# Patient Record
Sex: FEMALE | Race: BLACK OR AFRICAN AMERICAN | NOT HISPANIC OR LATINO | URBAN - METROPOLITAN AREA
[De-identification: names, ages, dates, MRNs, and addresses within clinical notes are randomized per-mention and may not be internally consistent; named-entity substitution may affect disease eponyms.]

---

## 2021-08-26 ENCOUNTER — EMERGENCY (EMERGENCY)
Facility: HOSPITAL | Age: 56
LOS: 1 days | Discharge: HOME | End: 2021-08-26
Attending: EMERGENCY MEDICINE
Payer: COMMERCIAL

## 2021-08-26 VITALS
OXYGEN SATURATION: 100 % | HEIGHT: 64 IN | DIASTOLIC BLOOD PRESSURE: 80 MMHG | WEIGHT: 179.9 LBS | HEART RATE: 97 BPM | TEMPERATURE: 98 F | RESPIRATION RATE: 20 BRPM | SYSTOLIC BLOOD PRESSURE: 123 MMHG

## 2021-08-26 DIAGNOSIS — Z90.49 ACQUIRED ABSENCE OF OTHER SPECIFIED PARTS OF DIGESTIVE TRACT: Chronic | ICD-10-CM

## 2021-08-26 DIAGNOSIS — Z98.89 OTHER SPECIFIED POSTPROCEDURAL STATES: Chronic | ICD-10-CM

## 2021-08-26 PROCEDURE — 99285 EMERGENCY DEPT VISIT HI MDM: CPT

## 2021-08-26 PROCEDURE — 93010 ELECTROCARDIOGRAM REPORT: CPT

## 2021-08-26 NOTE — ED ADULT TRIAGE NOTE - CHIEF COMPLAINT QUOTE
covid positive 8/5; reports improvement of symptoms till yesterday; return of sob with exertion and r calf pain radiating to upper r thigh

## 2021-08-27 VITALS
HEART RATE: 75 BPM | SYSTOLIC BLOOD PRESSURE: 132 MMHG | TEMPERATURE: 98 F | DIASTOLIC BLOOD PRESSURE: 84 MMHG | OXYGEN SATURATION: 98 % | RESPIRATION RATE: 15 BRPM

## 2021-08-27 LAB
ALBUMIN SERPL ELPH-MCNC: 4.3 G/DL — SIGNIFICANT CHANGE UP (ref 3.3–5)
ALP SERPL-CCNC: 55 U/L — SIGNIFICANT CHANGE UP (ref 40–120)
ALT FLD-CCNC: 40 U/L — SIGNIFICANT CHANGE UP (ref 10–45)
ANION GAP SERPL CALC-SCNC: 14 MMOL/L — SIGNIFICANT CHANGE UP (ref 5–17)
APTT BLD: 36 SEC — HIGH (ref 27.5–35.5)
AST SERPL-CCNC: 39 U/L — SIGNIFICANT CHANGE UP (ref 10–40)
BASOPHILS # BLD AUTO: 0.03 K/UL — SIGNIFICANT CHANGE UP (ref 0–0.2)
BASOPHILS NFR BLD AUTO: 0.7 % — SIGNIFICANT CHANGE UP (ref 0–2)
BILIRUB SERPL-MCNC: 0.5 MG/DL — SIGNIFICANT CHANGE UP (ref 0.2–1.2)
BUN SERPL-MCNC: 11 MG/DL — SIGNIFICANT CHANGE UP (ref 7–23)
CALCIUM SERPL-MCNC: 9.5 MG/DL — SIGNIFICANT CHANGE UP (ref 8.4–10.5)
CHLORIDE SERPL-SCNC: 102 MMOL/L — SIGNIFICANT CHANGE UP (ref 96–108)
CO2 SERPL-SCNC: 27 MMOL/L — SIGNIFICANT CHANGE UP (ref 22–31)
CREAT SERPL-MCNC: 0.84 MG/DL — SIGNIFICANT CHANGE UP (ref 0.5–1.3)
D DIMER BLD IA.RAPID-MCNC: 280 NG/ML DDU — HIGH
EOSINOPHIL # BLD AUTO: 0.26 K/UL — SIGNIFICANT CHANGE UP (ref 0–0.5)
EOSINOPHIL NFR BLD AUTO: 6.1 % — HIGH (ref 0–6)
GLUCOSE SERPL-MCNC: 111 MG/DL — HIGH (ref 70–99)
HCT VFR BLD CALC: 36.1 % — SIGNIFICANT CHANGE UP (ref 34.5–45)
HGB BLD-MCNC: 11.6 G/DL — SIGNIFICANT CHANGE UP (ref 11.5–15.5)
IMM GRANULOCYTES NFR BLD AUTO: 0.2 % — SIGNIFICANT CHANGE UP (ref 0–1.5)
INR BLD: 1.04 RATIO — SIGNIFICANT CHANGE UP (ref 0.88–1.16)
LYMPHOCYTES # BLD AUTO: 2.09 K/UL — SIGNIFICANT CHANGE UP (ref 1–3.3)
LYMPHOCYTES # BLD AUTO: 48.8 % — HIGH (ref 13–44)
MCHC RBC-ENTMCNC: 29.2 PG — SIGNIFICANT CHANGE UP (ref 27–34)
MCHC RBC-ENTMCNC: 32.1 GM/DL — SIGNIFICANT CHANGE UP (ref 32–36)
MCV RBC AUTO: 90.9 FL — SIGNIFICANT CHANGE UP (ref 80–100)
MONOCYTES # BLD AUTO: 0.44 K/UL — SIGNIFICANT CHANGE UP (ref 0–0.9)
MONOCYTES NFR BLD AUTO: 10.3 % — SIGNIFICANT CHANGE UP (ref 2–14)
NEUTROPHILS # BLD AUTO: 1.45 K/UL — LOW (ref 1.8–7.4)
NEUTROPHILS NFR BLD AUTO: 33.9 % — LOW (ref 43–77)
NRBC # BLD: 0 /100 WBCS — SIGNIFICANT CHANGE UP (ref 0–0)
NT-PROBNP SERPL-SCNC: 51 PG/ML — SIGNIFICANT CHANGE UP (ref 0–300)
PLATELET # BLD AUTO: 289 K/UL — SIGNIFICANT CHANGE UP (ref 150–400)
POTASSIUM SERPL-MCNC: 3.5 MMOL/L — SIGNIFICANT CHANGE UP (ref 3.5–5.3)
POTASSIUM SERPL-SCNC: 3.5 MMOL/L — SIGNIFICANT CHANGE UP (ref 3.5–5.3)
PROT SERPL-MCNC: 7.6 G/DL — SIGNIFICANT CHANGE UP (ref 6–8.3)
PROTHROM AB SERPL-ACNC: 12.4 SEC — SIGNIFICANT CHANGE UP (ref 10.6–13.6)
RBC # BLD: 3.97 M/UL — SIGNIFICANT CHANGE UP (ref 3.8–5.2)
RBC # FLD: 14.1 % — SIGNIFICANT CHANGE UP (ref 10.3–14.5)
SODIUM SERPL-SCNC: 143 MMOL/L — SIGNIFICANT CHANGE UP (ref 135–145)
TROPONIN T, HIGH SENSITIVITY RESULT: 12 NG/L — SIGNIFICANT CHANGE UP (ref 0–51)
WBC # BLD: 4.28 K/UL — SIGNIFICANT CHANGE UP (ref 3.8–10.5)
WBC # FLD AUTO: 4.28 K/UL — SIGNIFICANT CHANGE UP (ref 3.8–10.5)

## 2021-08-27 PROCEDURE — 93970 EXTREMITY STUDY: CPT

## 2021-08-27 PROCEDURE — 85025 COMPLETE CBC W/AUTO DIFF WBC: CPT

## 2021-08-27 PROCEDURE — 71045 X-RAY EXAM CHEST 1 VIEW: CPT

## 2021-08-27 PROCEDURE — 83880 ASSAY OF NATRIURETIC PEPTIDE: CPT

## 2021-08-27 PROCEDURE — 93970 EXTREMITY STUDY: CPT | Mod: 26

## 2021-08-27 PROCEDURE — G1004: CPT

## 2021-08-27 PROCEDURE — 84484 ASSAY OF TROPONIN QUANT: CPT

## 2021-08-27 PROCEDURE — 71045 X-RAY EXAM CHEST 1 VIEW: CPT | Mod: 26

## 2021-08-27 PROCEDURE — 85379 FIBRIN DEGRADATION QUANT: CPT

## 2021-08-27 PROCEDURE — 93005 ELECTROCARDIOGRAM TRACING: CPT

## 2021-08-27 PROCEDURE — 85730 THROMBOPLASTIN TIME PARTIAL: CPT

## 2021-08-27 PROCEDURE — 71275 CT ANGIOGRAPHY CHEST: CPT | Mod: ME

## 2021-08-27 PROCEDURE — 80053 COMPREHEN METABOLIC PANEL: CPT

## 2021-08-27 PROCEDURE — 71275 CT ANGIOGRAPHY CHEST: CPT | Mod: 26,ME

## 2021-08-27 PROCEDURE — 99284 EMERGENCY DEPT VISIT MOD MDM: CPT | Mod: 25

## 2021-08-27 PROCEDURE — 85610 PROTHROMBIN TIME: CPT

## 2021-08-27 NOTE — ED PROVIDER NOTE - PHYSICAL EXAMINATION
General: NAD  HEENT: NCAT, PERRL  Cardiac: RRR, no murmurs, 2+ peripheral pulses  Chest: CTA (ambulatory pulse ox 100% on RA)  Abdomen: soft, non-distended, bowel sounds present, no ttp, no rebound or guarding  Extremities: no peripheral edema, calf tenderness, or leg size discrepancies  Skin: no rashes  Neuro: AAOx3, motor and sensory grossly intact  Psych: mood and affect appropriate

## 2021-08-27 NOTE — ED PROVIDER NOTE - PROGRESS NOTE DETAILS
FRAN Carey (PGY-2) - duplex negative, cxr clear, CTPE negative. Will dc w/ cards follow up for OP echo.

## 2021-08-27 NOTE — ED PROVIDER NOTE - ATTENDING CONTRIBUTION TO CARE
pt with JARVIS and R leg pain, in setting of recent covid. on exam, pt without any unilateral edema or tenderness, saturating normally, normal HR. clinically low concern for Pe given normal vitals and no sign of DVT but will send a DD to screen for potential pathology and will image accordingly if neg. will also assess for superimposed infection, chf, myocarditis.

## 2021-08-27 NOTE — ED PROVIDER NOTE - NSFOLLOWUPCLINICS_GEN_ALL_ED_FT
[facility]  Cardiology  [address] [place] [id number]  Phone: [phone]  Fax:

## 2021-08-27 NOTE — ED ADULT NURSE NOTE - OBJECTIVE STATEMENT
55yo F hx of DM comes to ED for sob. Covid on 8/2, improved, 2 days ago having exertional dyspnea, no cp, bilateral calf pain w/o swelling which has mostly resolved. Complaining of some pain along R medial thigh but mild. Denies fevers, abdominal pain, change in urine or bowel. No hx of clots, ca, no cigarettes

## 2021-08-27 NOTE — ED PROVIDER NOTE - OBJECTIVE STATEMENT
57yo F hx of DM comes to ED for sob. Covid on 8/2, improved, 2 days ago having exertional dyspnea, no cp, bilateral calf pain w/o swelling which has mostly resolved. Complaining of some pain along R medial thigh but mild. Denies fevers, abdominal pain, change in urine or bowel. No hx of clots, ca, no cigarettes. 55yo F hx of DM comes to ED for sob. COVID on 8/2, improved, 2 days ago having exertional dyspnea, no cp, bilateral calf pain w/o swelling which has mostly resolved. Complaining of some pain along R medial thigh but mild. Denies fevers, abdominal pain, change in urine or bowel. No hx of clots, ca, no cigarettes.

## 2021-08-27 NOTE — ED PROVIDER NOTE - CLINICAL SUMMARY MEDICAL DECISION MAKING FREE TEXT BOX
Pt recent covid comes to ED for 2 days of exertional dyspnea, bilateral calf pain. VSS. Exam unremarkable w/o findings consistent w/ DVT. this is likely post-COVID sequele including possible cardiomyopathy. Lower suspicion of DVT/PE given no hypoxia, or tachycardia, or risk factors aside from COVID. Labs, cardiac markers, ddimer, cxr, ekg, Reassess.

## 2021-08-27 NOTE — ED PROVIDER NOTE - PATIENT PORTAL LINK FT
You can access the FollowMyHealth Patient Portal offered by [facility] by registering at the following website: [email or web address]. By joining [employer]’s FollowMyHealth portal, you will also be able to view your health information using other applications (apps) compatible with our system.

## 2021-09-01 DIAGNOSIS — U07.1 COVID-19: ICD-10-CM

## 2021-09-07 ENCOUNTER — NON-APPOINTMENT (OUTPATIENT)
Age: 56
End: 2021-09-07

## 2021-09-07 ENCOUNTER — APPOINTMENT (OUTPATIENT)
Dept: CARDIOLOGY | Facility: CLINIC | Age: 56
End: 2021-09-07
Payer: COMMERCIAL

## 2021-09-07 ENCOUNTER — TRANSCRIPTION ENCOUNTER (OUTPATIENT)
Age: 56
End: 2021-09-07

## 2021-09-07 VITALS
OXYGEN SATURATION: 99 % | BODY MASS INDEX: 35.68 KG/M2 | HEIGHT: 66 IN | HEART RATE: 72 BPM | WEIGHT: 222 LBS | SYSTOLIC BLOOD PRESSURE: 107 MMHG | DIASTOLIC BLOOD PRESSURE: 72 MMHG

## 2021-09-07 DIAGNOSIS — Z82.49 FAMILY HISTORY OF ISCHEMIC HEART DISEASE AND OTHER DISEASES OF THE CIRCULATORY SYSTEM: ICD-10-CM

## 2021-09-07 DIAGNOSIS — R06.02 SHORTNESS OF BREATH: ICD-10-CM

## 2021-09-07 DIAGNOSIS — Z78.9 OTHER SPECIFIED HEALTH STATUS: ICD-10-CM

## 2021-09-07 DIAGNOSIS — R60.0 LOCALIZED EDEMA: ICD-10-CM

## 2021-09-07 DIAGNOSIS — E11.9 TYPE 2 DIABETES MELLITUS W/OUT COMPLICATIONS: ICD-10-CM

## 2021-09-07 DIAGNOSIS — Z83.3 FAMILY HISTORY OF DIABETES MELLITUS: ICD-10-CM

## 2021-09-07 PROCEDURE — 99244 OFF/OP CNSLTJ NEW/EST MOD 40: CPT

## 2021-09-07 PROCEDURE — 93000 ELECTROCARDIOGRAM COMPLETE: CPT

## 2021-09-07 RX ORDER — METFORMIN HYDROCHLORIDE 1000 MG/1
1000 TABLET, COATED ORAL
Qty: 180 | Refills: 0 | Status: ACTIVE | COMMUNITY
Start: 2021-06-23

## 2021-09-07 RX ORDER — GLYBURIDE 5 MG/1
5 TABLET ORAL
Qty: 180 | Refills: 0 | Status: ACTIVE | COMMUNITY
Start: 2021-01-29

## 2021-09-07 RX ORDER — HYDROCHLOROTHIAZIDE 25 MG/1
25 TABLET ORAL DAILY
Refills: 0 | Status: ACTIVE | COMMUNITY
Start: 2021-03-28

## 2021-09-11 NOTE — DISCUSSION/SUMMARY
[FreeTextEntry1] : The patient is a 56-year-old female diabetes mellitus, leg edema s/p COVID with leg pain. \par #1 Leg pain- refer to ortho, doppler negative\par #2 CV- cardiac CT normal, echo ordered\par #3 Htn- HCTZ prn\par #4 DM- glyburide, metformin\par

## 2021-09-11 NOTE — REVIEW OF SYSTEMS
[SOB] : shortness of breath [Negative] : Heme/Lymph [Dyspnea on exertion] : not dyspnea during exertion [Chest Discomfort] : no chest discomfort [Lower Ext Edema] : no extremity edema [Leg Claudication] : no intermittent leg claudication [Palpitations] : no palpitations

## 2021-09-11 NOTE — HISTORY OF PRESENT ILLNESS
[FreeTextEntry1] : Opal is a 56-year-old female DM, leg edema tx with HCTZ prn who presents with bilateral leg pain and shortness of breath. She went to ER. Doppler negative.  CXR pneumonia secondary to COVID. +COVID 8/2/21 treated with zpack and steroids. She states her breathing is better. He leg pain is the only thing bothering her. Her knees are bone on bone. The pain calf and raised up her to the thigh.

## 2021-10-29 ENCOUNTER — APPOINTMENT (OUTPATIENT)
Dept: CARDIOLOGY | Facility: CLINIC | Age: 56
End: 2021-10-29

## 2022-02-25 ENCOUNTER — TRANSCRIPTION ENCOUNTER (OUTPATIENT)
Age: 57
End: 2022-02-25

## 2023-02-26 ENCOUNTER — EMERGENCY (EMERGENCY)
Facility: HOSPITAL | Age: 58
LOS: 1 days | Discharge: HOME | End: 2023-02-26
Attending: EMERGENCY MEDICINE
Payer: COMMERCIAL

## 2023-02-26 VITALS
DIASTOLIC BLOOD PRESSURE: 79 MMHG | HEART RATE: 101 BPM | HEIGHT: 67 IN | OXYGEN SATURATION: 98 % | SYSTOLIC BLOOD PRESSURE: 145 MMHG | WEIGHT: 210.98 LBS | RESPIRATION RATE: 18 BRPM | TEMPERATURE: 98 F

## 2023-02-26 DIAGNOSIS — Z90.49 ACQUIRED ABSENCE OF OTHER SPECIFIED PARTS OF DIGESTIVE TRACT: Chronic | ICD-10-CM

## 2023-02-26 DIAGNOSIS — Z98.89 OTHER SPECIFIED POSTPROCEDURAL STATES: Chronic | ICD-10-CM

## 2023-02-26 PROCEDURE — 99285 EMERGENCY DEPT VISIT HI MDM: CPT | Mod: 25

## 2023-02-26 PROCEDURE — 20611 DRAIN/INJ JOINT/BURSA W/US: CPT

## 2023-02-26 NOTE — ED ADULT NURSE NOTE - OBJECTIVE STATEMENT
58yF A&Ox4 58yF A&Ox4 Ambulatory PMHX of Arthritis (b/l knees tx w/ gel injection by orthopedist Dr. Noriega) & DM presents to the ED c/o R knee pain x 5:30pm 02/26/23. ascending pain to R thigh 6/10 at rest 9/10 at exertion. pt had 2 syringes of fluid removed and received two gel injections on Monday. pt denies falls, f/c/n/v/d, trauma. pt  at bedside. 58yF A&Ox4 Ambulatory PMHX of Arthritis (b/l knees tx w/ gel injection by orthopedist Dr. Noriega) & DM presents to the ED c/o R knee pain x 5:30pm and R knee swelling x 1.5 weeks. ascending pain to R thigh 6/10 at rest 9/10 at exertion. pt took aspirin at 6:30pm. pt had 2 syringes of fluid removed and received two gel injections on Monday. pt denies falls, f/c/n/v/d, trauma. pt  at bedside. 58yF A&Ox4 Ambulatory PMHX of Arthritis (b/l knees tx w/ hyaluronic acid injection by orthopedist Dr. Noriega) & DM presents to the ED c/o R knee pain x 5:30pm and R knee swelling x 1.5 weeks. ascending pain to R thigh 6/10 at rest 9/10 at exertion. pt took aspirin at 6:30pm. pt had 2 syringes of fluid removed and received two gel injections on Monday. pt denies falls, f/c/n/v/d, trauma, blood clots, recent travel, surgeries, CP, SOB. pt  at bedside. 58yF A&Ox4 Ambulatory PMHX of Arthritis (b/l knees tx w/ hyaluronic acid injection by orthopedist Dr. Noriega) & DM presents to the ED c/o R knee pain x 5:30pm and R knee swelling x 1.5 weeks. ascending pain to R thigh 6/10 at rest 9/10 at exertion and limited ROM of R leg due to pain. pt took aspirin at 6:30pm. pt had 2 syringes of fluid removed and received two hyaluronic acid injections on Monday. pt denies R lower extremity edema, falls, f/c/n/v/d, trauma, blood clots, recent travel, surgeries, CP, SOB. pt  at bedside 58yF A&Ox4 Ambulatory PMHX of Arthritis (b/l knees tx w/ hyaluronic acid injection by orthopedist Dr. Noriega) & DM presents to the ED c/o R knee pain x 5:30pm and R knee swelling x 1.5 weeks. ascending pain to R thigh 6/10 at rest 9/10 at exertion and limited ROM of R leg due to pain. pt took aspirin at 6:30pm. pt had 2 syringes of fluid removed and received two hyaluronic acid injections on Monday. pt denies R lower extremity edema, falls, f/c/n/v/d, trauma, blood clots, recent travel, surgeries, CP, SOB. pt  at bedside.

## 2023-02-27 VITALS
TEMPERATURE: 98 F | DIASTOLIC BLOOD PRESSURE: 78 MMHG | HEART RATE: 78 BPM | SYSTOLIC BLOOD PRESSURE: 113 MMHG | OXYGEN SATURATION: 84 % | RESPIRATION RATE: 18 BRPM

## 2023-02-27 LAB
ALBUMIN SERPL ELPH-MCNC: 4.3 G/DL — SIGNIFICANT CHANGE UP (ref 3.3–5)
ALP SERPL-CCNC: 61 U/L — SIGNIFICANT CHANGE UP (ref 40–120)
ALT FLD-CCNC: 22 U/L — SIGNIFICANT CHANGE UP (ref 10–45)
ANION GAP SERPL CALC-SCNC: 14 MMOL/L — SIGNIFICANT CHANGE UP (ref 5–17)
APTT BLD: 33.2 SEC — SIGNIFICANT CHANGE UP (ref 27.5–35.5)
AST SERPL-CCNC: 20 U/L — SIGNIFICANT CHANGE UP (ref 10–40)
B PERT IGG+IGM PNL SER: ABNORMAL
BASOPHILS # BLD AUTO: 0.04 K/UL — SIGNIFICANT CHANGE UP (ref 0–0.2)
BASOPHILS NFR BLD AUTO: 0.6 % — SIGNIFICANT CHANGE UP (ref 0–2)
BILIRUB SERPL-MCNC: 0.3 MG/DL — SIGNIFICANT CHANGE UP (ref 0.2–1.2)
BLD GP AB SCN SERPL QL: NEGATIVE — SIGNIFICANT CHANGE UP
BUN SERPL-MCNC: 20 MG/DL — SIGNIFICANT CHANGE UP (ref 7–23)
CALCIUM SERPL-MCNC: 9.9 MG/DL — SIGNIFICANT CHANGE UP (ref 8.4–10.5)
CHLORIDE SERPL-SCNC: 104 MMOL/L — SIGNIFICANT CHANGE UP (ref 96–108)
CO2 SERPL-SCNC: 24 MMOL/L — SIGNIFICANT CHANGE UP (ref 22–31)
COLOR FLD: SIGNIFICANT CHANGE UP
CREAT SERPL-MCNC: 0.95 MG/DL — SIGNIFICANT CHANGE UP (ref 0.5–1.3)
CRP SERPL-MCNC: 11 MG/L — HIGH (ref 0–4)
EGFR: 69 ML/MIN/1.73M2 — SIGNIFICANT CHANGE UP
EOSINOPHIL # BLD AUTO: 0.15 K/UL — SIGNIFICANT CHANGE UP (ref 0–0.5)
EOSINOPHIL NFR BLD AUTO: 2.2 % — SIGNIFICANT CHANGE UP (ref 0–6)
ERYTHROCYTE [SEDIMENTATION RATE] IN BLOOD: 37 MM/HR — HIGH (ref 0–20)
FLUID INTAKE SUBSTANCE CLASS: SIGNIFICANT CHANGE UP
GLUCOSE FLD-MCNC: 175 MG/DL — SIGNIFICANT CHANGE UP
GLUCOSE SERPL-MCNC: 179 MG/DL — HIGH (ref 70–99)
HCG SERPL-ACNC: <2 MIU/ML — SIGNIFICANT CHANGE UP
HCT VFR BLD CALC: 36.5 % — SIGNIFICANT CHANGE UP (ref 34.5–45)
HGB BLD-MCNC: 11.6 G/DL — SIGNIFICANT CHANGE UP (ref 11.5–15.5)
IMM GRANULOCYTES NFR BLD AUTO: 0.3 % — SIGNIFICANT CHANGE UP (ref 0–0.9)
INR BLD: 1.01 RATIO — SIGNIFICANT CHANGE UP (ref 0.88–1.16)
LYMPHOCYTES # BLD AUTO: 3.07 K/UL — SIGNIFICANT CHANGE UP (ref 1–3.3)
LYMPHOCYTES # BLD AUTO: 44.5 % — HIGH (ref 13–44)
LYMPHOCYTES # FLD: 50 % — SIGNIFICANT CHANGE UP
MCHC RBC-ENTMCNC: 29.8 PG — SIGNIFICANT CHANGE UP (ref 27–34)
MCHC RBC-ENTMCNC: 31.8 GM/DL — LOW (ref 32–36)
MCV RBC AUTO: 93.8 FL — SIGNIFICANT CHANGE UP (ref 80–100)
MONOCYTES # BLD AUTO: 0.63 K/UL — SIGNIFICANT CHANGE UP (ref 0–0.9)
MONOCYTES NFR BLD AUTO: 9.1 % — SIGNIFICANT CHANGE UP (ref 2–14)
MONOS+MACROS # FLD: 4 % — SIGNIFICANT CHANGE UP
NEUTROPHILS # BLD AUTO: 2.99 K/UL — SIGNIFICANT CHANGE UP (ref 1.8–7.4)
NEUTROPHILS NFR BLD AUTO: 43.3 % — SIGNIFICANT CHANGE UP (ref 43–77)
NEUTROPHILS-BODY FLUID: 46 % — SIGNIFICANT CHANGE UP
NRBC # BLD: 0 /100 WBCS — SIGNIFICANT CHANGE UP (ref 0–0)
PLATELET # BLD AUTO: 339 K/UL — SIGNIFICANT CHANGE UP (ref 150–400)
POTASSIUM SERPL-MCNC: 3.9 MMOL/L — SIGNIFICANT CHANGE UP (ref 3.5–5.3)
POTASSIUM SERPL-SCNC: 3.9 MMOL/L — SIGNIFICANT CHANGE UP (ref 3.5–5.3)
PROT FLD-MCNC: 4.4 G/DL — SIGNIFICANT CHANGE UP
PROT SERPL-MCNC: 7.4 G/DL — SIGNIFICANT CHANGE UP (ref 6–8.3)
PROTHROM AB SERPL-ACNC: 11.6 SEC — SIGNIFICANT CHANGE UP (ref 10.5–13.4)
RBC # BLD: 3.89 M/UL — SIGNIFICANT CHANGE UP (ref 3.8–5.2)
RBC # FLD: 13.8 % — SIGNIFICANT CHANGE UP (ref 10.3–14.5)
RCV VOL RI: HIGH /UL (ref 0–0)
RH IG SCN BLD-IMP: POSITIVE — SIGNIFICANT CHANGE UP
SODIUM SERPL-SCNC: 142 MMOL/L — SIGNIFICANT CHANGE UP (ref 135–145)
SYNOVIAL CRYSTALS CLARITY: ABNORMAL
SYNOVIAL CRYSTALS COLOR: ABNORMAL
SYNOVIAL CRYSTALS ID: SIGNIFICANT CHANGE UP
SYNOVIAL CRYSTALS TUBE: SIGNIFICANT CHANGE UP
TOTAL NUCLEATED CELL COUNT, BODY FLUID: 71 /UL — SIGNIFICANT CHANGE UP
TUBE TYPE: SIGNIFICANT CHANGE UP
WBC # BLD: 6.9 K/UL — SIGNIFICANT CHANGE UP (ref 3.8–10.5)
WBC # FLD AUTO: 6.9 K/UL — SIGNIFICANT CHANGE UP (ref 3.8–10.5)

## 2023-02-27 PROCEDURE — 85730 THROMBOPLASTIN TIME PARTIAL: CPT

## 2023-02-27 PROCEDURE — 89051 BODY FLUID CELL COUNT: CPT

## 2023-02-27 PROCEDURE — 87186 SC STD MICRODIL/AGAR DIL: CPT

## 2023-02-27 PROCEDURE — 99284 EMERGENCY DEPT VISIT MOD MDM: CPT | Mod: 25

## 2023-02-27 PROCEDURE — 76942 ECHO GUIDE FOR BIOPSY: CPT | Mod: 26

## 2023-02-27 PROCEDURE — 80053 COMPREHEN METABOLIC PANEL: CPT

## 2023-02-27 PROCEDURE — 85610 PROTHROMBIN TIME: CPT

## 2023-02-27 PROCEDURE — 86900 BLOOD TYPING SEROLOGIC ABO: CPT

## 2023-02-27 PROCEDURE — 73564 X-RAY EXAM KNEE 4 OR MORE: CPT

## 2023-02-27 PROCEDURE — 76942 ECHO GUIDE FOR BIOPSY: CPT

## 2023-02-27 PROCEDURE — 84702 CHORIONIC GONADOTROPIN TEST: CPT

## 2023-02-27 PROCEDURE — 84157 ASSAY OF PROTEIN OTHER: CPT

## 2023-02-27 PROCEDURE — 36415 COLL VENOUS BLD VENIPUNCTURE: CPT

## 2023-02-27 PROCEDURE — 87075 CULTR BACTERIA EXCEPT BLOOD: CPT

## 2023-02-27 PROCEDURE — 86850 RBC ANTIBODY SCREEN: CPT

## 2023-02-27 PROCEDURE — 93971 EXTREMITY STUDY: CPT

## 2023-02-27 PROCEDURE — 87205 SMEAR GRAM STAIN: CPT

## 2023-02-27 PROCEDURE — 96374 THER/PROPH/DIAG INJ IV PUSH: CPT | Mod: XU

## 2023-02-27 PROCEDURE — 89060 EXAM SYNOVIAL FLUID CRYSTALS: CPT

## 2023-02-27 PROCEDURE — 87070 CULTURE OTHR SPECIMN AEROBIC: CPT

## 2023-02-27 PROCEDURE — 73564 X-RAY EXAM KNEE 4 OR MORE: CPT | Mod: 26,RT

## 2023-02-27 PROCEDURE — 85652 RBC SED RATE AUTOMATED: CPT

## 2023-02-27 PROCEDURE — 82945 GLUCOSE OTHER FLUID: CPT

## 2023-02-27 PROCEDURE — 20610 DRAIN/INJ JOINT/BURSA W/O US: CPT | Mod: RT

## 2023-02-27 PROCEDURE — 93971 EXTREMITY STUDY: CPT | Mod: 26,RT

## 2023-02-27 PROCEDURE — 86140 C-REACTIVE PROTEIN: CPT

## 2023-02-27 PROCEDURE — 87077 CULTURE AEROBIC IDENTIFY: CPT

## 2023-02-27 PROCEDURE — 86901 BLOOD TYPING SEROLOGIC RH(D): CPT

## 2023-02-27 PROCEDURE — 85025 COMPLETE CBC W/AUTO DIFF WBC: CPT

## 2023-02-27 RX ORDER — OXYCODONE HYDROCHLORIDE 5 MG/1
1 TABLET ORAL
Qty: 9 | Refills: 0
Start: 2023-02-27 | End: 2023-03-01

## 2023-02-27 RX ORDER — MORPHINE SULFATE 50 MG/1
4 CAPSULE, EXTENDED RELEASE ORAL ONCE
Refills: 0 | Status: DISCONTINUED | OUTPATIENT
Start: 2023-02-27 | End: 2023-02-27

## 2023-02-27 RX ADMIN — MORPHINE SULFATE 4 MILLIGRAM(S): 50 CAPSULE, EXTENDED RELEASE ORAL at 02:07

## 2023-02-27 NOTE — ED PROVIDER NOTE - NSFOLLOWUPINSTRUCTIONS_ED_ALL_ED_FT
It was a pleasure caring for you today.  Please follow-up with your orthopedic surgeon tomorrow in office and bring these results with you.  You may take 500-1000 mg acetaminophen every 6 hours, as needed for pain  You may take 600 mg ibuprofen every 8 hours, with food, as needed for pain   You are also sent oxycodone to your pharmacy.  You may take this if your pain is not controlled with Tylenol and ibuprofen taken together.    Please make sure to come back to the hospital if you have any worsening pain, experience fevers or chills, are unable to walk

## 2023-02-27 NOTE — ED PROVIDER NOTE - CLINICAL SUMMARY MEDICAL DECISION MAKING FREE TEXT BOX
Patient is a 58-year-old female with a history of diabetes on Trulicity,  bilateral knee arthritis with weekly hyaluronic acid injections who presents to the ED with 1 day of right lower extremity calf pain, worse when walking.   Will evaluate right knee for septic joint versus gout versus hematoma.  Will consider osteo.  We will get duplex to evaluate for DVT

## 2023-02-27 NOTE — ED PROVIDER NOTE - PHYSICAL EXAMINATION
const: Well-nourished, Well-developed, appearing stated age.  Eyes: no conjunctival injection, and symmetrical lids.  HEENT: Head NCAT, no lesions. Atraumatic external nose and ears. Moist MM.  Neck: Symmetric, trachea midline.   RESP: Unlabored respiratory effort.   GI: Nontender/Nondistended,  MSK: Right knee edema and warmth  and TTP.  No erythema.  No lower leg edema or erythema. ROM of right knee limited due to pain.   Skin: Warm, dry and intact.   Neuro: CNs II-XII grossly intact. Motor & Sensation grossly intact.  Psych: Awake, Alert, & Oriented (AAO) x3. Appropriate mood and affect.

## 2023-02-27 NOTE — ED PROVIDER NOTE - PROGRESS NOTE DETAILS
Attending note (Asad): Lab results reviewed no significant leukocytosis CRP 11 which is not suggestive of an acute infectious process.  Fluid obtained during arthrocentesis analyzed and lab nucleated cell count 71 also not suggestive of septic joint.  Some hemarthrosis noted.  Patient stable for discharge to follow-up with her orthopedist.

## 2023-02-27 NOTE — ED PROVIDER NOTE - OBJECTIVE STATEMENT
Patient is a 58-year-old female with a history of diabetes on Trulicity,  bilateral knee arthritis with weekly hyaluronic acid injections who presents to the ED with 1 day of right lower extremity calf pain, worse when walking.  patient had hyaluronic acid injection to the right knee on Monday.  Patient had effusions in her bilateral knees.  Previously but no history of septic joints.  no fevers or chills. No history of gout. Denies any edema or erythema of the leg.  Denies any history of hormone use, recent travel, surgeries, blood clots, cancers, family history of blood clots, lupus, antiphospholipid,  Or Coag disorders.  No chest pain, SOB. not on AC

## 2023-02-27 NOTE — ED PROVIDER NOTE - PATIENT PORTAL LINK FT
You can access the FollowMyHealth Patient Portal offered by [facility] by registering at the following website: [email or web address]. By joining [employer]’s FollowMyHealth portal, you will also be able to view your health information using other applications (apps) compatible with our system.

## 2023-02-27 NOTE — ED PROVIDER NOTE - TOBACCO USE
[Pedal Pulses Present] : the pedal pulses are present [Normal] : normal gait, coordination grossly intact, no focal deficits and deep tendon reflexes were 2+ and symmetric [de-identified] : + LE edema up to mid-calf b/l Unknown if ever smoked

## 2023-02-27 NOTE — ED PROVIDER NOTE - ATTENDING CONTRIBUTION TO CARE
58-year-old female with a history of osteoarthritis and diabetes type 2, presenting with right knee pain and swelling.  Setting of recent knee injection by her orthopedist.  No history of DVT.  No surgery on right leg.  No fevers no redness.    On exam has edema and mild warmth to the right knee diffusely.  Mild popliteal tenderness.  DP and PT pulses are palpable in the distal extremity.  Cap refill is brisk and sensation is intact throughout the extremity; soft compartments throughout extremity.  Range of motion of right knee is limited due to pain.    Likely osteoarthritis related knee effusion though given recent instrumentation does raise concern for septic joints though overall low likelihood based on physical exam.  Low suspicion for DVT.  No recent trauma to suggest fracture or dislocation.  Is neurovascularly intact and soft compartments.  Will obtain ultrasound to evaluate for DVT and to evaluate for knee effusion.  X-ray to evaluate further.  Obtain screening labs including CBC CMP ESR and CRP, and if effusion noted on ultrasound can obtain sample to evaluate for evidence of infection.  If no evidence of  septic joint after arthrocentesis and no evidence of DVT can likely DC with instructions to follow-up with her orthopedist.

## 2023-02-27 NOTE — ED PROCEDURE NOTE - ATTENDING CONTRIBUTION TO CARE
I have participated in and supervised all key portions of the above procedures and agree with the above documentation. CRISTOFER Kamara MD

## 2023-03-07 ENCOUNTER — EMERGENCY (EMERGENCY)
Facility: HOSPITAL | Age: 58
LOS: 1 days | Discharge: HOME | End: 2023-03-07
Attending: EMERGENCY MEDICINE
Payer: COMMERCIAL

## 2023-03-07 VITALS
SYSTOLIC BLOOD PRESSURE: 156 MMHG | HEART RATE: 84 BPM | OXYGEN SATURATION: 98 % | HEIGHT: 67 IN | DIASTOLIC BLOOD PRESSURE: 91 MMHG | RESPIRATION RATE: 16 BRPM | WEIGHT: 210.98 LBS | TEMPERATURE: 99 F

## 2023-03-07 DIAGNOSIS — Z90.49 ACQUIRED ABSENCE OF OTHER SPECIFIED PARTS OF DIGESTIVE TRACT: Chronic | ICD-10-CM

## 2023-03-07 DIAGNOSIS — Z98.89 OTHER SPECIFIED POSTPROCEDURAL STATES: Chronic | ICD-10-CM

## 2023-03-07 LAB
BASOPHILS # BLD AUTO: 0.05 K/UL — SIGNIFICANT CHANGE UP (ref 0–0.2)
BASOPHILS NFR BLD AUTO: 0.8 % — SIGNIFICANT CHANGE UP (ref 0–2)
EOSINOPHIL # BLD AUTO: 0.25 K/UL — SIGNIFICANT CHANGE UP (ref 0–0.5)
EOSINOPHIL NFR BLD AUTO: 4.1 % — SIGNIFICANT CHANGE UP (ref 0–6)
HCT VFR BLD CALC: 38 % — SIGNIFICANT CHANGE UP (ref 34.5–45)
HGB BLD-MCNC: 12.2 G/DL — SIGNIFICANT CHANGE UP (ref 11.5–15.5)
IMM GRANULOCYTES NFR BLD AUTO: 0.2 % — SIGNIFICANT CHANGE UP (ref 0–0.9)
LYMPHOCYTES # BLD AUTO: 3.04 K/UL — SIGNIFICANT CHANGE UP (ref 1–3.3)
LYMPHOCYTES # BLD AUTO: 49.9 % — HIGH (ref 13–44)
MCHC RBC-ENTMCNC: 29.3 PG — SIGNIFICANT CHANGE UP (ref 27–34)
MCHC RBC-ENTMCNC: 32.1 GM/DL — SIGNIFICANT CHANGE UP (ref 32–36)
MCV RBC AUTO: 91.3 FL — SIGNIFICANT CHANGE UP (ref 80–100)
MONOCYTES # BLD AUTO: 0.51 K/UL — SIGNIFICANT CHANGE UP (ref 0–0.9)
MONOCYTES NFR BLD AUTO: 8.4 % — SIGNIFICANT CHANGE UP (ref 2–14)
NEUTROPHILS # BLD AUTO: 2.23 K/UL — SIGNIFICANT CHANGE UP (ref 1.8–7.4)
NEUTROPHILS NFR BLD AUTO: 36.6 % — LOW (ref 43–77)
NRBC # BLD: 0 /100 WBCS — SIGNIFICANT CHANGE UP (ref 0–0)
PLATELET # BLD AUTO: 441 K/UL — HIGH (ref 150–400)
RBC # BLD: 4.16 M/UL — SIGNIFICANT CHANGE UP (ref 3.8–5.2)
RBC # FLD: 14.5 % — SIGNIFICANT CHANGE UP (ref 10.3–14.5)
WBC # BLD: 6.09 K/UL — SIGNIFICANT CHANGE UP (ref 3.8–10.5)
WBC # FLD AUTO: 6.09 K/UL — SIGNIFICANT CHANGE UP (ref 3.8–10.5)

## 2023-03-07 PROCEDURE — 99285 EMERGENCY DEPT VISIT HI MDM: CPT

## 2023-03-07 PROCEDURE — 73562 X-RAY EXAM OF KNEE 3: CPT | Mod: 26,RT

## 2023-03-07 PROCEDURE — 99221 1ST HOSP IP/OBS SF/LOW 40: CPT

## 2023-03-07 RX ORDER — MORPHINE SULFATE 50 MG/1
4 CAPSULE, EXTENDED RELEASE ORAL ONCE
Refills: 0 | Status: DISCONTINUED | OUTPATIENT
Start: 2023-03-07 | End: 2023-03-07

## 2023-03-07 RX ADMIN — MORPHINE SULFATE 4 MILLIGRAM(S): 50 CAPSULE, EXTENDED RELEASE ORAL at 23:43

## 2023-03-07 NOTE — ED PROVIDER NOTE - NS ED ATTENDING STATEMENT MOD
This was a shared visit with the LUKAS. I reviewed and verified the documentation and independently performed the documented:

## 2023-03-07 NOTE — ED ADULT NURSE NOTE - OBJECTIVE STATEMENT
58y F PMH DM2 presents to the ED c/o R knee swelling. Pt reports being seen at [facility] on Sunday for arthrocentesis. Cultures sent and resulted +cultures today. Pt currently undergoing gel injections for arthritis. Pt reports pain in R knee. Pt endorses decreased ROM on R knee. Can ambulate independently. Denies fevers, chills, cp, sob, n/v/d, headache, abd pain, urinary symptoms. Comfort and safety maintained.

## 2023-03-07 NOTE — ED PROVIDER NOTE - OBJECTIVE STATEMENT
58-year-old female with a history of diabetes on Trulicity, bilateral knee arthritis with weekly hyaluronic acid injections ortho Dr. Chele Noriega, presents to ED for evaluation of R knee pain due to call for positive joint fluid cultures from knee tap on 2/27. Cx grew rare staph capitis. Pt unable to range knee. Able to bear weight. Seen by ortho yesterday and received hyaluronic acid inj. No f/c.

## 2023-03-07 NOTE — ED PROVIDER NOTE - PATIENT PORTAL LINK FT
You can access the FollowMyHealth Patient Portal offered by [facility] by registering at the following website: [email or web address]. By joining [employer]’s FollowMyHealth portal, you will also be able to view your health information using other applications (apps) compatible with our system.

## 2023-03-07 NOTE — ED PROVIDER NOTE - PHYSICAL EXAMINATION
GEN: Pt non-toxic in NAD, alert.  PSYCH: Affect and mood appropriate.  EYES: Sclera white w/o injection.  ENT: MMM. Neck supple. Airway patent.  RESP: Normal effort.  MSK: R knee mildly swollen and warm compared to L. No overlying erythema. TTP with manipulation. Unable to flex knee 2/2 pain.  NEURO: No motor or sensory deficits.  VASC: Strong distal pulses. Trace LE pitting edema. No calf tenderness.  SKIN: No rashes or lesions. No

## 2023-03-07 NOTE — ED ADULT TRIAGE NOTE - CHIEF COMPLAINT QUOTE
patient had arthrocentesis of left knee on sunday, called back for positive staph infection from fluid. c/o continued pain in the left knee

## 2023-03-07 NOTE — ED ADULT NURSE NOTE - NS ED NURSE DC INFO COMPLEXITY
Patient/Caregiver provided printed discharge information. Simple: Patient demonstrates quick and easy understanding/Returned Demonstration

## 2023-03-07 NOTE — ED PROVIDER NOTE - PROGRESS NOTE DETAILS
Endorsed to Dr SELAM Enriquez MD, Facep Dewey Schmidt PA-C: s/w ortho will see pt in ED. Patient endorsed to me at signout previously as awaiting rest of lab results and consultation with orthopedics for a rule out septic joint.  Patient is afebrile.  Labs reviewed no leukocytosis, CRP 7 (down from 11 last week).  Patient having some pain.  On my exam no significant erythema and joint is not hot to touch.  Had seen her on initial ED visit and per my recollection appeared more swollen at that time.  Case discussed with orthopedics who recommended ultrasound to evaluate for Baker's cyst will obtain ultrasound and then likely discharge home with outpatient orthopedics follow-up. Per review of work-up and discussion with ortho service, not concerned for septic joint at this time. Tyron STOVALL (PGY-3)  U/S of joint showing some soft tissue swelling but no baker cyst. ace wrap placed on pt's knee. explained results of w/u to pt. given return precautions. our ortho team does not want pt to take any abx at this time; culture is likely a contaminant. will d/c to home with ortho f/u

## 2023-03-07 NOTE — ED PROVIDER NOTE - ATTENDING APP SHARED VISIT CONTRIBUTION OF CARE
Private Physician Keith Gaspar pcp/piyush,Hari recio/arnold  58y female pmh DMT2 on metformin. Covid 2021, No Hypertension,hld,cancer,cva,cad,travel. Pt employed works from home. Now comes to ed c/o  pain rt knee swelling approx two weeks ago. PT had had [facility]cent[facility]. Subsequently swelling again. Pt was seen again in ed. Had arthrocentesis. in ed and again was drained following day at [facility]. No fever chills. cp, shortness of breath palps Private Physician Keith Gaspar pcp/piyush,Hari Facundo recio/arnold  58y female pmh DMT2 on metformin. Covid 2021, No Hypertension,hld,cancer,cva,cad,travel. Pt employed works from home. Now comes to ed c/o  pain rt knee swelling approx two weeks ago. PT had had arthrocentesis at [facility]. Subsequently swelling again. Pt was seen again in ed. Had arthrocentesis. in ed and again was drained following day at [facility]. No fever chills. cp, shortness of breath palps. Pain worse w flexion, Heent normocephalic atraumatic neck supple chest clear anterior & posterior cv no rubs, gallops or murmurs Abd soft MSK +tender swelling to knee w mild warmth, ttp, Unable to fully flex  George Enriquez MD, Facep

## 2023-03-07 NOTE — ED PROVIDER NOTE - NSICDXPASTMEDICALHX_GEN_ALL_CORE_FT
PAST MEDICAL HISTORY:  2019 novel coronavirus disease (COVID-19) 7/2021    Diabetes mellitus ON metformin    Obesity (BMI 30-39.9)     Seasonal allergies

## 2023-03-07 NOTE — ED PROVIDER NOTE - NSFOLLOWUPINSTRUCTIONS_ED_ALL_ED_FT
Follow up with Dr. Norigea in 2-3 days for further evaluation of your symptoms    Please take over the counter pain medications such as Motrin (600mg every 6 hours) and Tylenol (650mg every 4 hours) for pain     Return to the Emergency Department if there are any new or worsening symptoms, including but not limited to fevers, uncontrolled pain, weakness, or numbness

## 2023-03-07 NOTE — ED POST DISCHARGE NOTE - DETAILS
3/7/23: I called the pt, she reports worsening pain in the affected knee, I advised her to return to the ED as soon as possible for reevaluation. Pt denies f/c. pt informed of the severity of possible septic joint, reports she will return to ED. Pt appreciative of call. -Perez Cleary PA-C 3/8: patient returned for evaluation, seen by ortho, per documentation they did not feel that abx were warranted and patient was encouraged to continue to follow up . - Stefania Anna PA-C

## 2023-03-07 NOTE — ED PROVIDER NOTE - CLINICAL SUMMARY MEDICAL DECISION MAKING FREE TEXT BOX
Adult female w rt knee effusion tapped multiple times over past two weeks, Had cultures grow out staph and referred to ed for eval. CHeck labs, xry orhto cs and reassess  George Enriquez MD, Facep

## 2023-03-08 VITALS
DIASTOLIC BLOOD PRESSURE: 71 MMHG | RESPIRATION RATE: 17 BRPM | HEART RATE: 88 BPM | OXYGEN SATURATION: 96 % | SYSTOLIC BLOOD PRESSURE: 117 MMHG

## 2023-03-08 LAB
ALBUMIN SERPL ELPH-MCNC: 4.1 G/DL — SIGNIFICANT CHANGE UP (ref 3.3–5)
ALBUMIN SERPL ELPH-MCNC: 4.5 G/DL — SIGNIFICANT CHANGE UP (ref 3.3–5)
ALP SERPL-CCNC: 46 U/L — SIGNIFICANT CHANGE UP (ref 40–120)
ALP SERPL-CCNC: 55 U/L — SIGNIFICANT CHANGE UP (ref 40–120)
ALT FLD-CCNC: 11 U/L — SIGNIFICANT CHANGE UP (ref 10–45)
ALT FLD-CCNC: 20 U/L — SIGNIFICANT CHANGE UP (ref 10–45)
ANION GAP SERPL CALC-SCNC: 11 MMOL/L — SIGNIFICANT CHANGE UP (ref 5–17)
ANION GAP SERPL CALC-SCNC: 11 MMOL/L — SIGNIFICANT CHANGE UP (ref 5–17)
AST SERPL-CCNC: 18 U/L — SIGNIFICANT CHANGE UP (ref 10–40)
AST SERPL-CCNC: 74 U/L — HIGH (ref 10–40)
BILIRUB SERPL-MCNC: 0.2 MG/DL — SIGNIFICANT CHANGE UP (ref 0.2–1.2)
BILIRUB SERPL-MCNC: 0.3 MG/DL — SIGNIFICANT CHANGE UP (ref 0.2–1.2)
BUN SERPL-MCNC: 16 MG/DL — SIGNIFICANT CHANGE UP (ref 7–23)
BUN SERPL-MCNC: 17 MG/DL — SIGNIFICANT CHANGE UP (ref 7–23)
CALCIUM SERPL-MCNC: 9.6 MG/DL — SIGNIFICANT CHANGE UP (ref 8.4–10.5)
CALCIUM SERPL-MCNC: 9.8 MG/DL — SIGNIFICANT CHANGE UP (ref 8.4–10.5)
CHLORIDE SERPL-SCNC: 103 MMOL/L — SIGNIFICANT CHANGE UP (ref 96–108)
CHLORIDE SERPL-SCNC: 105 MMOL/L — SIGNIFICANT CHANGE UP (ref 96–108)
CO2 SERPL-SCNC: 25 MMOL/L — SIGNIFICANT CHANGE UP (ref 22–31)
CO2 SERPL-SCNC: 26 MMOL/L — SIGNIFICANT CHANGE UP (ref 22–31)
CREAT SERPL-MCNC: 0.68 MG/DL — SIGNIFICANT CHANGE UP (ref 0.5–1.3)
CREAT SERPL-MCNC: 0.87 MG/DL — SIGNIFICANT CHANGE UP (ref 0.5–1.3)
EGFR: 100 ML/MIN/1.73M2 — SIGNIFICANT CHANGE UP
EGFR: 77 ML/MIN/1.73M2 — SIGNIFICANT CHANGE UP
FLUAV AG NPH QL: SIGNIFICANT CHANGE UP
FLUBV AG NPH QL: SIGNIFICANT CHANGE UP
GLUCOSE SERPL-MCNC: 159 MG/DL — HIGH (ref 70–99)
GLUCOSE SERPL-MCNC: 169 MG/DL — HIGH (ref 70–99)
POTASSIUM SERPL-MCNC: 4.1 MMOL/L — SIGNIFICANT CHANGE UP (ref 3.5–5.3)
POTASSIUM SERPL-MCNC: SIGNIFICANT CHANGE UP MMOL/L (ref 3.5–5.3)
POTASSIUM SERPL-SCNC: 4.1 MMOL/L — SIGNIFICANT CHANGE UP (ref 3.5–5.3)
POTASSIUM SERPL-SCNC: SIGNIFICANT CHANGE UP MMOL/L (ref 3.5–5.3)
PROT SERPL-MCNC: 7.2 G/DL — SIGNIFICANT CHANGE UP (ref 6–8.3)
PROT SERPL-MCNC: 8.5 G/DL — HIGH (ref 6–8.3)
RSV RNA NPH QL NAA+NON-PROBE: SIGNIFICANT CHANGE UP
SARS-COV-2 RNA SPEC QL NAA+PROBE: SIGNIFICANT CHANGE UP
SODIUM SERPL-SCNC: 139 MMOL/L — SIGNIFICANT CHANGE UP (ref 135–145)
SODIUM SERPL-SCNC: 142 MMOL/L — SIGNIFICANT CHANGE UP (ref 135–145)

## 2023-03-08 PROCEDURE — 99284 EMERGENCY DEPT VISIT MOD MDM: CPT | Mod: 25

## 2023-03-08 PROCEDURE — 87637 SARSCOV2&INF A&B&RSV AMP PRB: CPT

## 2023-03-08 PROCEDURE — 87040 BLOOD CULTURE FOR BACTERIA: CPT

## 2023-03-08 PROCEDURE — 76882 US LMTD JT/FCL EVL NVASC XTR: CPT | Mod: 26,RT

## 2023-03-08 PROCEDURE — 85025 COMPLETE CBC W/AUTO DIFF WBC: CPT

## 2023-03-08 PROCEDURE — 36415 COLL VENOUS BLD VENIPUNCTURE: CPT

## 2023-03-08 PROCEDURE — 80053 COMPREHEN METABOLIC PANEL: CPT

## 2023-03-08 PROCEDURE — 96374 THER/PROPH/DIAG INJ IV PUSH: CPT

## 2023-03-08 PROCEDURE — 76882 US LMTD JT/FCL EVL NVASC XTR: CPT

## 2023-03-08 PROCEDURE — 86140 C-REACTIVE PROTEIN: CPT

## 2023-03-08 PROCEDURE — 96372 THER/PROPH/DIAG INJ SC/IM: CPT | Mod: XU

## 2023-03-08 PROCEDURE — 73562 X-RAY EXAM OF KNEE 3: CPT

## 2023-03-08 RX ORDER — ACETAMINOPHEN 500 MG
975 TABLET ORAL ONCE
Refills: 0 | Status: COMPLETED | OUTPATIENT
Start: 2023-03-08 | End: 2023-03-08

## 2023-03-08 RX ORDER — KETOROLAC TROMETHAMINE 30 MG/ML
15 SYRINGE (ML) INJECTION ONCE
Refills: 0 | Status: DISCONTINUED | OUTPATIENT
Start: 2023-03-08 | End: 2023-03-08

## 2023-03-08 RX ADMIN — Medication 975 MILLIGRAM(S): at 02:50

## 2023-03-08 RX ADMIN — Medication 15 MILLIGRAM(S): at 02:49

## 2023-03-08 NOTE — CONSULT NOTE ADULT - SUBJECTIVE AND OBJECTIVE BOX
58y Female PMH DM2 presenting for follow up of right knee aspiration cultures (23) growing staph epidermidis species (cell count 71. Patient states she has had progressive right knee swelling for the past month, worse over the past two weeks. She is followed by Dr. Chele Noriega as an outpatient for knee osteoarthritis. States she has had two right knee aspirations with a total of 50cc removed in the past month. Approximately 1 week ago, she was getting out of bed when she felt acute onset pain and swelling in her right knee. Denies direct trauma to the knee or fall. Reports pain and difficulty moving and bearing weight on affected extremity afterward. At the time, aspiration of the right knee was performed in ED, resulting in: Cell count 71 (46% segs), negative crystals, negative gram stain. CRP 11, ESR 37 at the time. Duplex US performed was also negative for DVT. Denies fevers. Denies numbness/tingling of the affected extremity. No other bone or joint complaints.    PAST MEDICAL & SURGICAL HISTORY:  Diabetes mellitus  ON metformin      Obesity (BMI 30-39.9)      Seasonal allergies       novel coronavirus disease (COVID-19)  2021      S/P cholecystectomy        S/P  section  x 3          No Known Allergies      acetaminophen     Tablet .. 975 milliGRAM(s) Oral once  ketorolac   Injectable 15 milliGRAM(s) IntraMuscular Once      Physical Exam  T(C): 37.1 (23 @ 23:42), Max: 37.3 (23 @ 20:00)  HR: 101 (23 @ 23:42) (84 - 101)  BP: 137/83 (23 @ 23:42) (137/83 - 156/91)  RR: 16 (23 @ 23:42) (16 - 16)  SpO2: 98% (23 @ 23:42) (98% - 98%)  Wt(kg): --    Gen: NAD  RLE: skin intact, +swelling, TTP about the knee  Knee ROM limited 0-30 degrees 2/2 pain  Knee stable to varus/valgus stress and anterior/posterior drawer  Motor intact distally TA/GS/EHL/FHL  SILT s/s/sp/dp/t  2+ DP    Secondary Survey: Full ROM of unaffected extremities, SILT globally, compartments soft, no bony TTP over bony prominences, no calf TTP, able to SLR with bilateral LE, no TTP along axial spine    Imaging  X-ray of the right knee shows significant tricompartmental arthritis with subchondral cysts and osteophytic changes. No acute fracture or dislocation    Culture - Joint Fluid (23 @ 01:37)   Gram Stain:   polymorphonuclear leukocytes seen per low power field   No organisms seen per oil power field   by cytocentrifuge   - Ampicillin/Sulbactam: S <=8/4   - Cefazolin: S <=4   - Clindamycin: S <=0.25   - Erythromycin: S <=0.25   - Gentamicin: S <=1 Should not be used as monotherapy   - Oxacillin: S <=0.25   - Rifampin: S <=1 Should not be used as monotherapy   - Tetracycline: S <=1   - Trimethoprim/Sulfamethoxazole: S <=0.5/9.5   - Vancomycin: S 1   Specimen Source: Joint Fl Synovial Fluid   Culture Results:   Rare Staphylococcus capitis   Few Cutibacterium acnes   Susceptibility to follow.   Organism Identification: Staphylococcus capitis   Organism: Staphylococcus capitis   Method Type: MP

## 2023-03-08 NOTE — CONSULT NOTE ADULT - ASSESSMENT
A/P: 58y Female with right knee pain related to swelling possibly in the setting of ruptured Baker's cyst. Knee aspiration from 3/8 growing staph capitis is probable contaminant from skin radha. Low concern for septic arthritis currently  - FU ultrasound of popliteal fossa to evaluate Baker's cyst  - pain control  - WBAT  - Recommend continued follow up with Dr. Chele Noriega as outpatient

## 2023-03-13 LAB
CULTURE RESULTS: SIGNIFICANT CHANGE UP
CULTURE RESULTS: SIGNIFICANT CHANGE UP
SPECIMEN SOURCE: SIGNIFICANT CHANGE UP
SPECIMEN SOURCE: SIGNIFICANT CHANGE UP

## 2023-12-28 NOTE — ED ADULT NURSE NOTE - BREATH SOUNDS, MLM
Pt states she fell over her walker landing face first. Pt states c-collar uncomfortable. Otherwise NAD. Clear

## 2024-12-20 ENCOUNTER — NON-APPOINTMENT (OUTPATIENT)
Age: 59
End: 2024-12-20

## 2024-12-30 ENCOUNTER — NON-APPOINTMENT (OUTPATIENT)
Age: 59
End: 2024-12-30